# Patient Record
Sex: MALE | Race: WHITE | NOT HISPANIC OR LATINO | Employment: UNEMPLOYED | URBAN - METROPOLITAN AREA
[De-identification: names, ages, dates, MRNs, and addresses within clinical notes are randomized per-mention and may not be internally consistent; named-entity substitution may affect disease eponyms.]

---

## 2021-09-18 ENCOUNTER — HOSPITAL ENCOUNTER (EMERGENCY)
Facility: HOSPITAL | Age: 21
Discharge: HOME | End: 2021-09-18
Attending: EMERGENCY MEDICINE | Admitting: EMERGENCY MEDICINE

## 2021-09-18 VITALS
BODY MASS INDEX: 33.33 KG/M2 | OXYGEN SATURATION: 96 % | TEMPERATURE: 99.8 F | DIASTOLIC BLOOD PRESSURE: 85 MMHG | RESPIRATION RATE: 16 BRPM | SYSTOLIC BLOOD PRESSURE: 146 MMHG | HEIGHT: 69 IN | HEART RATE: 94 BPM | WEIGHT: 225 LBS

## 2021-09-18 DIAGNOSIS — U07.1 COVID-19: Primary | ICD-10-CM

## 2021-09-18 DIAGNOSIS — R05.8 RESPIRATORY TRACT CONGESTION WITH COUGH: ICD-10-CM

## 2021-09-18 PROCEDURE — 99282 EMERGENCY DEPT VISIT SF MDM: CPT | Performed by: EMERGENCY MEDICINE

## 2021-09-18 PROCEDURE — 99282 EMERGENCY DEPT VISIT SF MDM: CPT

## 2021-09-18 RX ORDER — BENZONATATE 100 MG/1
100 CAPSULE ORAL 2 TIMES DAILY PRN
Qty: 15 CAPSULE | Refills: 0 | Status: SHIPPED | OUTPATIENT
Start: 2021-09-18 | End: 2021-09-25

## 2021-09-18 RX ORDER — DEXTROMETHORPHAN HYDROBROMIDE AND PROMETHAZINE HYDROCHLORIDE 15; 6.25 MG/5ML; MG/5ML
5 SYRUP ORAL EVERY 8 HOURS PRN
Qty: 118 ML | Refills: 0 | Status: SHIPPED | OUTPATIENT
Start: 2021-09-18

## 2021-09-18 NOTE — ED PROVIDER NOTES
Subjective   History of Present Illness  History of Present Illness    Chief complaint: Cough, congestion, shortness of breath    Location: Chest    Quality/Severity: Moderate cough and congestion    Timing/Duration: Present for 1 week    Modifying Factors: Worse when trying to take a deep breath    Narrative: This patient presents for evaluation of a cough with congestion and the feeling of shortness of breath.  He was diagnosed with Covid about 1 week ago and has been feeling ill for about 8 days now.  He is no longer having any fevers.  He is not having any vomiting or diarrhea.  Occasionally he does have some gagging when he is in a coughing spell.  He says it feels like there is a lot of congestion in his chest and it makes it hard from the take a deep breath.  He has been trying to take Mucinex but that does not seem to relieve his symptoms so far.  He is not a smoker.  He has no other underlying health conditions.    Associated Symptoms: As above    Review of Systems   Constitutional: Negative for activity change and fever.   HENT: Positive for congestion. Negative for facial swelling.    Respiratory: Positive for cough and shortness of breath.    Cardiovascular: Negative for chest pain.   Gastrointestinal: Negative for abdominal pain and diarrhea.   Skin: Negative for color change and wound.   Neurological: Negative for syncope and headaches.   All other systems reviewed and are negative.      History reviewed. No pertinent past medical history.    No Known Allergies    History reviewed. No pertinent surgical history.    History reviewed. No pertinent family history.    Social History     Socioeconomic History   • Marital status: Single     Spouse name: Not on file   • Number of children: Not on file   • Years of education: Not on file   • Highest education level: Not on file     ED Triage Vitals   Temp Heart Rate Resp BP SpO2   09/18/21 1329 09/18/21 1330 09/18/21 1330 09/18/21 1330 09/18/21 1330   99.8 °F  (37.7 °C) 94 16 146/85 96 %      Temp src Heart Rate Source Patient Position BP Location FiO2 (%)   09/18/21 1329 09/18/21 1330 09/18/21 1330 09/18/21 1330 --   Oral Monitor Lying Right arm            Objective   Physical Exam  Vitals and nursing note reviewed.   Constitutional:       General: He is not in acute distress.     Appearance: He is well-developed. He is not toxic-appearing.   HENT:      Head: Normocephalic and atraumatic.   Eyes:      General:         Right eye: No discharge.         Left eye: No discharge.      Pupils: Pupils are equal, round, and reactive to light.   Cardiovascular:      Rate and Rhythm: Normal rate and regular rhythm.      Pulses: Normal pulses.      Heart sounds: Normal heart sounds.   Pulmonary:      Effort: Pulmonary effort is normal. No tachypnea, accessory muscle usage or respiratory distress.      Breath sounds: Normal breath sounds. No decreased breath sounds, wheezing, rhonchi or rales.      Comments: Breath sounds are completely clear bilaterally.  Completely normal work of breathing is demonstrated.  No coughing noted throughout the entire H&P  Chest:      Chest wall: No mass or tenderness.   Musculoskeletal:         General: No deformity. Normal range of motion.      Cervical back: Normal range of motion and neck supple.   Skin:     General: Skin is warm and dry.      Findings: No erythema or rash.   Neurological:      General: No focal deficit present.      Mental Status: He is alert and oriented to person, place, and time.   Psychiatric:         Mood and Affect: Mood normal. Mood is not anxious.         Behavior: Behavior normal.         Thought Content: Thought content normal.         Judgment: Judgment normal.         Procedures           ED Course  ED Course as of Sep 18 1405   Sat Sep 18, 2021   1404 Patient presented with complaint of cough and congestion, having been diagnosed with COVID-19 about 1 week ago.  His physical exam was entirely reassuring to me.  He  "demonstrated completely normal work of breathing.  His lungs were clear bilaterally.  His oxygenation was 90% on room air when I was there with him.  I see no need for x-rays or any diagnostics of any kind right now.  I encouraged him that his symptoms should be improving very soon but that they need to simply run the course.  I wrote a couple of other prescriptions to help him with symptomatic management.  I reviewed with him the usual \"return to ER\" instructions prior to discharge.    [DESEAN]      ED Course User Index  [DESEAN] Mani Nair MD                                           [facility]    Final diagnoses:   COVID-19   Respiratory tract congestion with cough       ED Disposition  ED Disposition     ED Disposition Condition Comment    Discharge Stable           No follow-up provider specified.       Medication List      New Prescriptions    benzonatate 100 MG capsule  Commonly known as: TESSALON  Take 1 capsule by mouth 2 (Two) Times a Day As Needed for Cough for up to 7 days.     promethazine-dextromethorphan 6.25-15 MG/5ML syrup  Commonly known as: PROMETHAZINE-DM  Take 5 mL by mouth Every 8 (Eight) Hours As Needed for Cough (congestion).           Where to Get Your Medications      These medications were sent to [facility] [address] - [address] [place] [address] - [phone]  - [phone]   [address] [address]    Phone: [phone]   · benzonatate 100 MG capsule  · promethazine-dextromethorphan 6.25-15 MG/5ML syrup          Mani Nair MD  09/18/21 7814    "

## 2021-09-18 NOTE — DISCHARGE INSTRUCTIONS
Please return to the emergency room for any worsening pain, fevers, cough, congestion, difficulties breathing or any other concerns.